# Patient Record
(demographics unavailable — no encounter records)

---

## 2025-05-07 NOTE — PLAN
[FreeTextEntry1] : Gastroenterology screening for colorectal cancer- Rx provided for initial consultation and screening colonoscopy with [facility] gastroenterology group at [place], [place]. Recommended to consult with Dr. Camila Alberts   MISC/Endocrine -continue with vitamin B-12 supplement 1000 IU p.o.q.d- check vitamin B-12 -continue with vitamin D supplement 1000 IU p.o.q.d- check vitamin D -advise to maintain an optimal diet with low carbohydrates/fat and continue with CV exercise.  Immunization Shingles- discussed and advised to receive the Shingrix vaccine secondary to age and h/o chicken pox. Patient if interested will refer to the office or local pharmacy after consulting with medical insurance coverage.   Blood work collected at the office today Check EKG (as above); pending lab- Male Panel, HBA1cg, CBC, Lipid Profile, Iron w/Total binding, Ferritin, CMP, PSA, UA and Vitamin B-12, D

## 2025-05-07 NOTE — HISTORY OF PRESENT ILLNESS
[FreeTextEntry1] : New patient- Comprehensive establish care [de-identified] : ANNE-MARIE KOHLI is a 51-year-old M with past medical history as below who presents today to the office as new patient annual wellness visit and to establish care. Patient reports PMH right sciatica. Patient does not have any known chronic illnesses. Patient never had screening colonoscopy for colorectal cancer. Patient does have h/o EGD years ago which was unremarkable. Patient has had (x3) covid-19 vaccination. He is vaccinated against tetanus in 2024. Patient denies getting an influenza vaccine for this season of 7503-3317 and is not vaccinated against Shingles. He does have h/o chicken pox.  Patient follows a vegetarian diet for the most part and gets his protein from products and lentils, vegetables. Patient walks almost every day and does CV exercise 2x a week. He wears glasses, vision and hearing are stable.   Social HX: works as  management;  with two children (19 yrs and 17 yrs), no tobacco, no alcohol, no illicit drug use Surgical HX: tonsillectomy, nasal septal repair Family HX: Mother: type II DM, HTN Father: HTN Allergy: h/o Levofloxacin- rash Patient currently does not take any prescribed medications. He does take vitamin B-12 1000 IU qd and vitamin D3 1000 IU qd.  Patient is fasting today.

## 2025-05-07 NOTE — HEALTH RISK ASSESSMENT
[Good] : ~his/her~  mood as  good [No] : In the past 12 months have you used drugs other than those required for medical reasons? No [Little interest or pleasure doing things] : 1) Little interest or pleasure doing things [Feeling down, depressed, or hopeless] : 2) Feeling down, depressed, or hopeless [0] : 2) Feeling down, depressed, or hopeless: Not at all (0) [PHQ-2 Negative - No further assessment needed] : PHQ-2 Negative - No further assessment needed [Never (0 pts)] : Never (0 points) [Audit-CScore] : 0 [[id number]] : 0

## 2025-05-07 NOTE — ASSESSMENT
[Vaccines Reviewed] : Immunizations reviewed today. Please see immunization details in the vaccine log within the immunization flowsheet.  [FreeTextEntry1] : ANNE-MARIE KOHLI is a 51-year-old M with a past medical history as above who presents to the office today as new patient annual wellness visit and to establish care.

## 2025-05-07 NOTE — END OF VISIT
[FreeTextEntry3] : I, Monse Fisher, acted solely as scribe for Dr. Pavan Azul DO on this date 05/07/2025.   All medical record entries made by the Scribe were at my, Dr. Pavan Azul DO direction and personally dictated by me on 05/07/2025, I have reviewed the chart and agree that the record accurately reflects my personal performance of the history, physical exam, assessment and plan. I have also personally directed, reviewed and agreed with the chart.     [Time Spent: ___ minutes] : I have spent [unfilled] minutes of time on the encounter which excludes teaching and separately reported services.

## 2025-05-07 NOTE — PHYSICAL EXAM
[No Acute Distress] : no acute distress [Well Nourished] : well nourished [Well Developed] : well developed [Well-Appearing] : well-appearing [Normal Sclera/Conjunctiva] : normal sclera/conjunctiva [PERRL] : pupils equal round and reactive to light [EOMI] : extraocular movements intact [Normal Outer Ear/Nose] : the outer ears and nose were normal in appearance [Normal Oropharynx] : the oropharynx was normal [No JVD] : no jugular venous distention [No Lymphadenopathy] : no lymphadenopathy [Supple] : supple [Thyroid Normal, No Nodules] : the thyroid was normal and there were no nodules present [No Respiratory Distress] : no respiratory distress  [No Accessory Muscle Use] : no accessory muscle use [Clear to Auscultation] : lungs were clear to auscultation bilaterally [Normal Rate] : normal rate  [Regular Rhythm] : with a regular rhythm [Normal S1, S2] : normal S1 and S2 [No Murmur] : no murmur heard [No Carotid Bruits] : no carotid bruits [No Abdominal Bruit] : a ~M bruit was not heard ~T in the abdomen [No Varicosities] : no varicosities [Pedal Pulses Present] : the pedal pulses are present [No Edema] : there was no peripheral edema [No Palpable Aorta] : no palpable aorta [No Extremity Clubbing/Cyanosis] : no extremity clubbing/cyanosis [Soft] : abdomen soft [Non Tender] : non-tender [Non-distended] : non-distended [No Masses] : no abdominal mass palpated [No HSM] : no HSM [Normal Bowel Sounds] : normal bowel sounds [Normal Posterior Cervical Nodes] : no posterior cervical lymphadenopathy [Normal Anterior Cervical Nodes] : no anterior cervical lymphadenopathy [No CVA Tenderness] : no CVA  tenderness [No Spinal Tenderness] : no spinal tenderness [No Joint Swelling] : no joint swelling [Grossly Normal Strength/Tone] : grossly normal strength/tone [No Rash] : no rash [Coordination Grossly Intact] : coordination grossly intact [No Focal Deficits] : no focal deficits [Normal Gait] : normal gait [Deep Tendon Reflexes (DTR)] : deep tendon reflexes were 2+ and symmetric [Normal Affect] : the affect was normal [Normal Insight/Judgement] : insight and judgment were intact [Normal Voice/Communication] : normal voice/communication [Normal TMs] : both tympanic membranes were normal [Normal Nasal Mucosa] : the nasal mucosa was normal [Declined Rectal Exam] : declined rectal exam [Normal Supraclavicular Nodes] : no supraclavicular lymphadenopathy [Kyphosis] : no kyphosis [Scoliosis] : no scoliosis [No Skin Lesions] : no skin lesions [Acne] : no acne [Speech Grossly Normal] : speech grossly normal [Memory Grossly Normal] : memory grossly normal [Alert and Oriented x3] : oriented to person, place, and time [Normal Mood] : the mood was normal [FreeTextEntry1] : refer to GI